# Patient Record
Sex: FEMALE | Race: BLACK OR AFRICAN AMERICAN | NOT HISPANIC OR LATINO | Employment: OTHER | URBAN - METROPOLITAN AREA
[De-identification: names, ages, dates, MRNs, and addresses within clinical notes are randomized per-mention and may not be internally consistent; named-entity substitution may affect disease eponyms.]

---

## 2017-02-02 ENCOUNTER — OFFICE VISIT (OUTPATIENT)
Dept: PSYCHIATRY | Facility: CLINIC | Age: 72
End: 2017-02-02
Payer: COMMERCIAL

## 2017-02-02 VITALS
WEIGHT: 234 LBS | SYSTOLIC BLOOD PRESSURE: 131 MMHG | BODY MASS INDEX: 37.61 KG/M2 | HEIGHT: 66 IN | HEART RATE: 45 BPM | DIASTOLIC BLOOD PRESSURE: 58 MMHG

## 2017-02-02 DIAGNOSIS — F31.9 BIPOLAR 1 DISORDER: Primary | ICD-10-CM

## 2017-02-02 PROCEDURE — 99214 OFFICE O/P EST MOD 30 MIN: CPT | Mod: S$GLB,,, | Performed by: PSYCHIATRY & NEUROLOGY

## 2017-02-02 PROCEDURE — 99999 PR PBB SHADOW E&M-EST. PATIENT-LVL II: CPT | Mod: PBBFAC,,, | Performed by: PSYCHIATRY & NEUROLOGY

## 2017-02-02 PROCEDURE — 1160F RVW MEDS BY RX/DR IN RCRD: CPT | Mod: S$GLB,,, | Performed by: PSYCHIATRY & NEUROLOGY

## 2017-02-02 PROCEDURE — 1159F MED LIST DOCD IN RCRD: CPT | Mod: S$GLB,,, | Performed by: PSYCHIATRY & NEUROLOGY

## 2017-02-02 PROCEDURE — 1157F ADVNC CARE PLAN IN RCRD: CPT | Mod: S$GLB,,, | Performed by: PSYCHIATRY & NEUROLOGY

## 2017-02-02 NOTE — PROGRESS NOTES
ESTABLISHED OUTPATIENT VISIT   E/M LEVEL 4: [id number]    ENCOUNTER DATE: 2/2/2017  SITE: [facility] [address]    HISTORY    CHIEF COMPLAINT   Ernestine Montejo is a 72 y.o. female who presents for follow up of mood d/o .    HPI   Reports, that Lithium remains helpful. Mood remains good.  Appears to be doing well psychiatrically.    Has been taking computer classes.    Psychiatric Review Of Systems - Is patient experiencing or having changes in:  sleep: good  appetite: no  Weight: has gained 3 lb's since previous visit  interest/pleasure/anhedonia: no  somatic symptoms: no  libido: no  anxiety/panic: no  guilty/hopelessness: no  concentration: no  S.I.B.s/risky behavior: no  Irritability: no  Racing thoughts: no  Impulsive behaviors: no  Paranoia:no  AVH:no    Recent alcohol: no  Recent drug: no    Medical ROS   Denies any current physical complaint     PAST MEDICAL, FAMILY AND SOCIAL HISTORY: The patient's past medical, family and social history have been reviewed and updated as appropriate within the electronic medical record - see encounter notes.    PSYCHOTROPIC MEDICATIONS   Lithium Carbonate 300 mg bid    EXAMINATION    Vitals:    02/02/17 1313   BP: (!) 131/58   Pulse: (!) 45     Weight: 234 lb's    CONSTITUTIONAL  General Appearance: well nourished    MUSCULOSKELETAL  Muscle Strength and Tone: normal strength and tone  Abnormal Involuntary Movements: no abnormal movement noted  Gait and Station: normal gait    PSYCHIATRIC   Level of Consciousness: alert  Orientation: oriented to person, place and time  Grooming: well groomed  Psychomotor Behavior: no restlessness/agitation  Speech: normal in rate, rhythm and volume  Language: normal vocabulary  Mood: euthymic  Affect: full range and appropriate  Thought Process: logical and goal directed  Associations: intact associations  Thought Content: no SI/HI  Memory: grossly intact  Attention: intact to content of interview  Fund of Knowledge: appears  adequate  Insight: good  Judgement: good    MEDICAL DECISION MAKING    DIAGNOSES  Mood d/o N.O.S.    PROBLEM LIST AND MANAGEMENT PLANS  - Mood d/o: continue Lithium 300 mg bid, monitor kidney function  - states, that she had blood work done recently via [facility]. Has requested that results be sent to me  - rtc 3 months    Time with patient: 25 min    LABORATORY DATA  No visits with results within 3 Month(s) from this visit.  Latest known visit with results is:    Admission on 05/30/2016, Discharged on 06/03/2016   Component Date Value Ref Range Status    Vitamin B-12 05/31/2016 526  210 - 950 pg/mL Final    Folate 05/31/2016 13.3  4.0 - 24.0 ng/mL Final    T3, Total 06/01/2016 81  60 - 180 ng/dL Final    RPR 06/01/2016 Non-reactive  Non-reactive Final    HIV 1/2 Ag/Ab 06/01/2016 Negative  Negative Final    TSH 06/01/2016 0.545  0.400 - 4.000 uIU/mL Final    Free T4 06/01/2016 1.03  0.71 - 1.51 ng/dL Final    WBC 06/03/2016 6.93  3.90 - 12.70 K/uL Final    RBC 06/03/2016 4.62  4.00 - 5.40 M/uL Final    Hemoglobin 06/03/2016 12.5  12.0 - 16.0 g/dL Final    Hematocrit 06/03/2016 38.1  37.0 - 48.5 % Final    MCV 06/03/2016 83  82 - 98 fL Final    MCH 06/03/2016 27.1  27.0 - 31.0 pg Final    MCHC 06/03/2016 32.8  32.0 - 36.0 % Final    RDW 06/03/2016 15.1* 11.5 - 14.5 % Final    Platelets 06/03/2016 231  150 - 350 K/uL Final    MPV 06/03/2016 10.9  9.2 - 12.9 fL Final    Gran # 06/03/2016 4.1  1.8 - 7.7 K/uL Final    Lymph # 06/03/2016 2.0  1.0 - 4.8 K/uL Final    Mono # 06/03/2016 0.6  0.3 - 1.0 K/uL Final    Eos # 06/03/2016 0.2  0.0 - 0.5 K/uL Final    Baso # 06/03/2016 0.01  0.00 - 0.20 K/uL Final    Gran% 06/03/2016 58.6  38.0 - 73.0 % Final    Lymph% 06/03/2016 29.0  18.0 - 48.0 % Final    Mono% 06/03/2016 9.2  4.0 - 15.0 % Final    Eosinophil% 06/03/2016 3.0  0.0 - 8.0 % Final    Basophil% 06/03/2016 0.1  0.0 - 1.9 % Final    Differential Method 06/03/2016 Automated   Final    Sodium  06/03/2016 142  136 - 145 mmol/L Final    Potassium 06/03/2016 3.8  3.5 - 5.1 mmol/L Final    Chloride 06/03/2016 111* 95 - 110 mmol/L Final    CO2 06/03/2016 21* 23 - 29 mmol/L Final    Glucose 06/03/2016 95  70 - 110 mg/dL Final    BUN, Bld 06/03/2016 14  8 - 23 mg/dL Final    Creatinine 06/03/2016 0.7  0.5 - 1.4 mg/dL Final    Calcium 06/03/2016 9.0  8.7 - 10.5 mg/dL Final    Total Protein 06/03/2016 6.3  6.0 - 8.4 g/dL Final    Albumin 06/03/2016 3.0* 3.5 - 5.2 g/dL Final    Total Bilirubin 06/03/2016 0.4  0.1 - 1.0 mg/dL Final    Comment: For infants and newborns, interpretation of results should be based  on gestational age, weight and in agreement with clinical  observations.  Premature Infant recommended reference ranges:  Up to 24 hours.............<8.0 mg/dL  Up to 48 hours............<12.0 mg/dL  3-5 days..................<15.0 mg/dL  6-29 days.................<15.0 mg/dL      Alkaline Phosphatase 06/03/2016 70  55 - 135 U/L Final    AST 06/03/2016 11  10 - 40 U/L Final    ALT 06/03/2016 8* 10 - 44 U/L Final    Anion Gap 06/03/2016 10  8 - 16 mmol/L Final    eGFR if African American 06/03/2016 >60.0  >60 mL/min/1.73 m^2 Final    eGFR if non African American 06/03/2016 >60.0  >60 mL/min/1.73 m^2 Final    Comment: Calculation used to obtain the estimated glomerular filtration  rate (eGFR) is the CKD-EPI equation. Since race is unknown   in our information system, the eGFR values for   -American and Non--American patients are given   for each creatinine result.             Christophe Iglesias

## 2017-02-02 NOTE — MR AVS SNAPSHOT
[facility] - Psychiatry  [address]  [facility] [address]  Phone: [phone]  Fax: [phone]                  Ernestine Montejo   2017 1:30 PM   Office Visit    Description:  Female : 1945   Provider:  Christophe Iglesias MD   Department:  [facility] - Psychiatry           Diagnoses this Visit        Comments    Bipolar 1 disorder    -  Primary            To Do List           Future Appointments        Provider Department Dept Phone    2017 1:30 PM Christophe Iglesias MD [facility] [phone]      Goals (5 Years of Data)     None      [facility]     [facility] Care Line -  Assistance  Registered nurses in the [facility] Center provide clinical advisement, health education, appointment booking, and other advisory services.  Call for this free service at [phone].             Medications           Message regarding Medications     Verify the changes and/or additions to your medication regime listed below are the same as discussed with your clinician today.  If any of these changes or additions are incorrect, please notify your healthcare provider.             Verify that the below list of medications is an accurate representation of the medications you are currently taking.  If none reported, the list may be blank. If incorrect, please contact your healthcare provider. Carry this list with you in case of emergency.           Current Medications     amlodipine (NORVASC) 5 MG tablet Take 1 tablet (5 mg total) by mouth every morning.    cloNIDine (CATAPRES) 0.1 MG tablet Take 1 tablet (0.1 mg total) by mouth 2 (two) times daily.    folic acid (FOLVITE) 1 MG tablet Take 1 tablet (1 mg total) by mouth once daily.    lithium (LITHOTAB) 300 mg tablet Take 1 tablet (300 mg total) by mouth 2 (two) times daily.    losartan (COZAAR) 100 MG tablet Take 1 tablet (100 mg total) by mouth once daily.    simvastatin (ZOCOR) 10 MG tablet Take 1 tablet (10 mg total)  "by mouth every evening.           Clinical Reference Information           Your Vitals Were     BP Pulse Height Weight BMI    131/58 45 5' 6" (1.676 m) 106.1 kg (234 lb) 37.77 kg/m2      Blood Pressure          Most Recent Value    BP  (!)  131/58      Allergies as of 2/2/2017     Codeine      Immunizations Administered on Date of Encounter - 2/2/2017     None      [facility] Sign-Up     Activating your [facility] account is as easy as 1-2-3!     1) Visit [email or web address], select Sign Up Now, enter this activation code and your date of birth, then select Next.  [email or web address]  Expires: 3/19/2017  3:17 PM      2) Create a username and password to use when you visit [facility] in the future and select a security question in case you lose your password and select Next.    3) Enter your e-mail address and click Sign Up!    Additional Information  If you have questions, please e-mail [email or web address] or call [phone] to talk to our [facility] staff. Remember, [facility] is NOT to be used for urgent needs. For medical emergencies, dial 911.         Language Assistance Services     ATTENTION: Language assistance services are available, free of charge. Please call [phone].      ATENCIÓN: Si habla español, tiene a rowell disposición servicios gratuitos de asistencia lingüística. Llame al [phone].     CHÚ Ý: N?u b?n nói Ti?ng Vi?t, có các d?ch v? h? tr? ngôn ng? mi?n phí dành cho b?n. G?i s? [phone].         [facility] complies with applicable Federal civil rights laws and does not discriminate on the basis of race, color, national origin, age, disability, or sex.        "

## 2017-08-21 ENCOUNTER — OFFICE VISIT (OUTPATIENT)
Dept: PSYCHIATRY | Facility: CLINIC | Age: 72
End: 2017-08-21
Payer: COMMERCIAL

## 2017-08-21 VITALS
HEIGHT: 66 IN | DIASTOLIC BLOOD PRESSURE: 66 MMHG | BODY MASS INDEX: 36.77 KG/M2 | SYSTOLIC BLOOD PRESSURE: 150 MMHG | WEIGHT: 228.81 LBS | HEART RATE: 47 BPM

## 2017-08-21 DIAGNOSIS — F31.9 BIPOLAR 1 DISORDER: Primary | ICD-10-CM

## 2017-08-21 PROCEDURE — 99999 PR PBB SHADOW E&M-EST. PATIENT-LVL II: CPT | Mod: PBBFAC,,, | Performed by: PSYCHIATRY & NEUROLOGY

## 2017-08-21 PROCEDURE — 1159F MED LIST DOCD IN RCRD: CPT | Mod: S$GLB,,, | Performed by: PSYCHIATRY & NEUROLOGY

## 2017-08-21 PROCEDURE — 99213 OFFICE O/P EST LOW 20 MIN: CPT | Mod: S$GLB,,, | Performed by: PSYCHIATRY & NEUROLOGY

## 2017-08-21 PROCEDURE — 3008F BODY MASS INDEX DOCD: CPT | Mod: S$GLB,,, | Performed by: PSYCHIATRY & NEUROLOGY

## 2017-08-21 RX ORDER — LITHIUM CARBONATE 300 MG
300 TABLET ORAL 2 TIMES DAILY
Qty: 60 TABLET | Refills: 3 | Status: ON HOLD | OUTPATIENT
Start: 2017-08-21 | End: 2022-10-19 | Stop reason: HOSPADM

## 2017-08-21 NOTE — PROGRESS NOTES
ESTABLISHED OUTPATIENT VISIT   E/M LEVEL 3: [id number]    ENCOUNTER DATE: 8/21/2017  SITE: [facility] [address]    HISTORY    CHIEF COMPLAINT   Ernestine Montejo is a 72 y.o. female who presents for follow up of mood d/o .    HPI     Reports doing well psychiatrically. Enjoys living in her residence. Satisfied with Lithium Carbonate 300 mg bid. Reports compliance.    Lab work from June of this year showed normal BUN/Creatinine]. Plans to see primary care MD tomorrow.    Psychiatric Review Of Systems - Is patient experiencing or having changes in:  sleep: good  appetite: no  Weight: has been able to lose some weight since previous visit.  interest/pleasure/anhedonia: no  somatic symptoms: no  libido: no  anxiety/panic: no  guilty/hopelessness: no  concentration: no  S.I.B.s/risky behavior: no  Irritability: no  Racing thoughts: no  Impulsive behaviors: no  Paranoia:no  AVH:no    Recent alcohol: no  Recent drug: no    Medical ROS   Denies any current physical complaint     PAST MEDICAL, FAMILY AND SOCIAL HISTORY: The patient's past medical, family and social history have been reviewed and updated as appropriate within the electronic medical record - see encounter notes.    PSYCHOTROPIC MEDICATIONS   Lithium Carbonate 300 mg bid    EXAMINATION    Vitals:    08/21/17 1414   BP: (!) 150/66   Pulse: (!) 47     Weight: 228 lb's    CONSTITUTIONAL  General Appearance: well nourished    MUSCULOSKELETAL  Muscle Strength and Tone: normal strength and tone  Abnormal Involuntary Movements: no abnormal movement noted  Gait and Station: normal gait    PSYCHIATRIC   Level of Consciousness: alert  Orientation: oriented to person, place and time  Grooming: well groomed  Psychomotor Behavior: no restlessness/agitation  Speech: normal in rate, rhythm and volume  Language: normal vocabulary  Mood: euthymic  Affect: full range and appropriate  Thought Process: logical and goal directed  Associations: intact  associations  Thought Content: no SI/HI  Memory: grossly intact  Attention: intact to content of interview  Fund of Knowledge: appears adequate  Insight: good  Judgement: good    MEDICAL DECISION MAKING    DIAGNOSES  Bipolar d/o    PROBLEM LIST AND MANAGEMENT PLANS  - Bipolar d/o: continue Lithium 300 mg bid, continue to monitor kidney function, obtain lithium level  - rtc 3 months    Time with patient: 20 min    LABORATORY DATA  No visits with results within 3 Month(s) from this visit.   Latest known visit with results is:   Admission on 05/30/2016, Discharged on 06/03/2016   Component Date Value Ref Range Status    Vitamin B-12 05/31/2016 526  210 - 950 pg/mL Final    Folate 05/31/2016 13.3  4.0 - 24.0 ng/mL Final    T3, Total 06/01/2016 81  60 - 180 ng/dL Final    RPR 06/01/2016 Non-reactive  Non-reactive Final    HIV 1/2 Ag/Ab 06/01/2016 Negative  Negative Final    TSH 06/01/2016 0.545  0.400 - 4.000 uIU/mL Final    Free T4 06/01/2016 1.03  0.71 - 1.51 ng/dL Final    WBC 06/03/2016 6.93  3.90 - 12.70 K/uL Final    RBC 06/03/2016 4.62  4.00 - 5.40 M/uL Final    Hemoglobin 06/03/2016 12.5  12.0 - 16.0 g/dL Final    Hematocrit 06/03/2016 38.1  37.0 - 48.5 % Final    MCV 06/03/2016 83  82 - 98 fL Final    MCH 06/03/2016 27.1  27.0 - 31.0 pg Final    MCHC 06/03/2016 32.8  32.0 - 36.0 % Final    RDW 06/03/2016 15.1* 11.5 - 14.5 % Final    Platelets 06/03/2016 231  150 - 350 K/uL Final    MPV 06/03/2016 10.9  9.2 - 12.9 fL Final    Gran # 06/03/2016 4.1  1.8 - 7.7 K/uL Final    Lymph # 06/03/2016 2.0  1.0 - 4.8 K/uL Final    Mono # 06/03/2016 0.6  0.3 - 1.0 K/uL Final    Eos # 06/03/2016 0.2  0.0 - 0.5 K/uL Final    Baso # 06/03/2016 0.01  0.00 - 0.20 K/uL Final    Gran% 06/03/2016 58.6  38.0 - 73.0 % Final    Lymph% 06/03/2016 29.0  18.0 - 48.0 % Final    Mono% 06/03/2016 9.2  4.0 - 15.0 % Final    Eosinophil% 06/03/2016 3.0  0.0 - 8.0 % Final    Basophil% 06/03/2016 0.1  0.0 - 1.9 % Final     Differential Method 06/03/2016 Automated   Final    Sodium 06/03/2016 142  136 - 145 mmol/L Final    Potassium 06/03/2016 3.8  3.5 - 5.1 mmol/L Final    Chloride 06/03/2016 111* 95 - 110 mmol/L Final    CO2 06/03/2016 21* 23 - 29 mmol/L Final    Glucose 06/03/2016 95  70 - 110 mg/dL Final    BUN, Bld 06/03/2016 14  8 - 23 mg/dL Final    Creatinine 06/03/2016 0.7  0.5 - 1.4 mg/dL Final    Calcium 06/03/2016 9.0  8.7 - 10.5 mg/dL Final    Total Protein 06/03/2016 6.3  6.0 - 8.4 g/dL Final    Albumin 06/03/2016 3.0* 3.5 - 5.2 g/dL Final    Total Bilirubin 06/03/2016 0.4  0.1 - 1.0 mg/dL Final    Comment: For infants and newborns, interpretation of results should be based  on gestational age, weight and in agreement with clinical  observations.  Premature Infant recommended reference ranges:  Up to 24 hours.............<8.0 mg/dL  Up to 48 hours............<12.0 mg/dL  3-5 days..................<15.0 mg/dL  6-29 days.................<15.0 mg/dL      Alkaline Phosphatase 06/03/2016 70  55 - 135 U/L Final    AST 06/03/2016 11  10 - 40 U/L Final    ALT 06/03/2016 8* 10 - 44 U/L Final    Anion Gap 06/03/2016 10  8 - 16 mmol/L Final    eGFR if African American 06/03/2016 >60.0  >60 mL/min/1.73 m^2 Final    eGFR if non African American 06/03/2016 >60.0  >60 mL/min/1.73 m^2 Final    Comment: Calculation used to obtain the estimated glomerular filtration  rate (eGFR) is the CKD-EPI equation. Since race is unknown   in our information system, the eGFR values for   -American and Non--American patients are given   for each creatinine result.             Christophe Iglesias

## 2020-02-09 ENCOUNTER — HOSPITAL ENCOUNTER (EMERGENCY)
Facility: HOSPITAL | Age: 75
Discharge: HOME | End: 2020-02-09
Attending: EMERGENCY MEDICINE
Payer: MEDICARE

## 2020-02-09 VITALS
BODY MASS INDEX: 37.77 KG/M2 | HEIGHT: 66 IN | DIASTOLIC BLOOD PRESSURE: 76 MMHG | TEMPERATURE: 98 F | OXYGEN SATURATION: 100 % | WEIGHT: 235 LBS | RESPIRATION RATE: 17 BRPM | HEART RATE: 72 BPM | SYSTOLIC BLOOD PRESSURE: 168 MMHG

## 2020-02-09 DIAGNOSIS — R45.851 SUICIDAL IDEATION: ICD-10-CM

## 2020-02-09 DIAGNOSIS — F41.9 ANXIETY: ICD-10-CM

## 2020-02-09 DIAGNOSIS — F33.1 MDD (MAJOR DEPRESSIVE DISORDER), RECURRENT EPISODE, MODERATE: Primary | ICD-10-CM

## 2020-02-09 DIAGNOSIS — F31.32 BIPOLAR 1 DISORDER, DEPRESSED, MODERATE: ICD-10-CM

## 2020-02-09 LAB
ALBUMIN SERPL BCP-MCNC: 3.7 G/DL (ref 3.5–5.2)
ALBUMIN SERPL BCP-MCNC: 3.7 G/DL (ref 3.5–5.2)
ALP SERPL-CCNC: 94 U/L (ref 55–135)
ALP SERPL-CCNC: 94 U/L (ref 55–135)
ALT SERPL W/O P-5'-P-CCNC: 8 U/L (ref 10–44)
ALT SERPL W/O P-5'-P-CCNC: 8 U/L (ref 10–44)
ANION GAP SERPL CALC-SCNC: 13 MMOL/L (ref 8–16)
ANION GAP SERPL CALC-SCNC: 13 MMOL/L (ref 8–16)
APAP SERPL-MCNC: <3 UG/ML (ref 10–20)
AST SERPL-CCNC: 12 U/L (ref 10–40)
AST SERPL-CCNC: 12 U/L (ref 10–40)
BASOPHILS # BLD AUTO: 0.04 K/UL (ref 0–0.2)
BASOPHILS NFR BLD: 0.6 % (ref 0–1.9)
BILIRUB SERPL-MCNC: 0.6 MG/DL (ref 0.1–1)
BILIRUB SERPL-MCNC: 0.6 MG/DL (ref 0.1–1)
BUN SERPL-MCNC: 9 MG/DL (ref 8–23)
BUN SERPL-MCNC: 9 MG/DL (ref 8–23)
CALCIUM SERPL-MCNC: 9.9 MG/DL (ref 8.7–10.5)
CALCIUM SERPL-MCNC: 9.9 MG/DL (ref 8.7–10.5)
CHLORIDE SERPL-SCNC: 106 MMOL/L (ref 95–110)
CHLORIDE SERPL-SCNC: 106 MMOL/L (ref 95–110)
CO2 SERPL-SCNC: 22 MMOL/L (ref 23–29)
CO2 SERPL-SCNC: 22 MMOL/L (ref 23–29)
CREAT SERPL-MCNC: 0.8 MG/DL (ref 0.5–1.4)
CREAT SERPL-MCNC: 0.8 MG/DL (ref 0.5–1.4)
DIFFERENTIAL METHOD: ABNORMAL
EOSINOPHIL # BLD AUTO: 0.1 K/UL (ref 0–0.5)
EOSINOPHIL NFR BLD: 1.6 % (ref 0–8)
ERYTHROCYTE [DISTWIDTH] IN BLOOD BY AUTOMATED COUNT: 15.1 % (ref 11.5–14.5)
EST. GFR  (AFRICAN AMERICAN): >60 ML/MIN/1.73 M^2
EST. GFR  (AFRICAN AMERICAN): >60 ML/MIN/1.73 M^2
EST. GFR  (NON AFRICAN AMERICAN): >60 ML/MIN/1.73 M^2
EST. GFR  (NON AFRICAN AMERICAN): >60 ML/MIN/1.73 M^2
ETHANOL SERPL-MCNC: <10 MG/DL
GLUCOSE SERPL-MCNC: 106 MG/DL (ref 70–110)
GLUCOSE SERPL-MCNC: 106 MG/DL (ref 70–110)
HCT VFR BLD AUTO: 46.2 % (ref 37–48.5)
HGB BLD-MCNC: 14.2 G/DL (ref 12–16)
IMM GRANULOCYTES # BLD AUTO: 0.02 K/UL (ref 0–0.04)
IMM GRANULOCYTES NFR BLD AUTO: 0.3 % (ref 0–0.5)
LYMPHOCYTES # BLD AUTO: 1.7 K/UL (ref 1–4.8)
LYMPHOCYTES NFR BLD: 26.4 % (ref 18–48)
MCH RBC QN AUTO: 26.2 PG (ref 27–31)
MCHC RBC AUTO-ENTMCNC: 30.7 G/DL (ref 32–36)
MCV RBC AUTO: 85 FL (ref 82–98)
MONOCYTES # BLD AUTO: 0.7 K/UL (ref 0.3–1)
MONOCYTES NFR BLD: 10.6 % (ref 4–15)
NEUTROPHILS # BLD AUTO: 3.9 K/UL (ref 1.8–7.7)
NEUTROPHILS NFR BLD: 60.5 % (ref 38–73)
NRBC BLD-RTO: 0 /100 WBC
PLATELET # BLD AUTO: 334 K/UL (ref 150–350)
PMV BLD AUTO: 11 FL (ref 9.2–12.9)
POTASSIUM SERPL-SCNC: 3.6 MMOL/L (ref 3.5–5.1)
POTASSIUM SERPL-SCNC: 3.6 MMOL/L (ref 3.5–5.1)
PROT SERPL-MCNC: 8.2 G/DL (ref 6–8.4)
PROT SERPL-MCNC: 8.2 G/DL (ref 6–8.4)
RBC # BLD AUTO: 5.42 M/UL (ref 4–5.4)
SODIUM SERPL-SCNC: 141 MMOL/L (ref 136–145)
SODIUM SERPL-SCNC: 141 MMOL/L (ref 136–145)
TSH SERPL DL<=0.005 MIU/L-ACNC: 0.49 UIU/ML (ref 0.4–4)
WBC # BLD AUTO: 6.4 K/UL (ref 3.9–12.7)

## 2020-02-09 PROCEDURE — 99283 EMERGENCY DEPT VISIT LOW MDM: CPT

## 2020-02-09 PROCEDURE — 99284 PR EMERGENCY DEPT VISIT,LEVEL IV: ICD-10-PCS | Mod: ,,, | Performed by: EMERGENCY MEDICINE

## 2020-02-09 PROCEDURE — 80320 DRUG SCREEN QUANTALCOHOLS: CPT

## 2020-02-09 PROCEDURE — 85025 COMPLETE CBC W/AUTO DIFF WBC: CPT

## 2020-02-09 PROCEDURE — 80053 COMPREHEN METABOLIC PANEL: CPT

## 2020-02-09 PROCEDURE — 84443 ASSAY THYROID STIM HORMONE: CPT

## 2020-02-09 PROCEDURE — 80329 ANALGESICS NON-OPIOID 1 OR 2: CPT

## 2020-02-09 PROCEDURE — 99284 EMERGENCY DEPT VISIT MOD MDM: CPT | Mod: ,,, | Performed by: EMERGENCY MEDICINE

## 2020-02-09 RX ORDER — MIRTAZAPINE 15 MG/1
15 TABLET, ORALLY DISINTEGRATING ORAL NIGHTLY
Qty: 30 TABLET | Refills: 0 | Status: SHIPPED | OUTPATIENT
Start: 2020-02-09 | End: 2020-03-18 | Stop reason: SDUPTHER

## 2020-02-09 NOTE — ED NOTES
Patient resting in stretcher, NAD noted, 1:1 observation maintained. All safety precautions in place. Patient declines any needs at this time. Patient denies pain. Will continue to monitor.

## 2020-02-09 NOTE — ED PROVIDER NOTES
Encounter Date: 2/9/2020       History     Chief Complaint   Patient presents with    Psychiatric Evaluation     bipolar depression, denies  suicidal homicical ideation, off meds     75F with Bipolar 1, HTN, and HL presents to ED with Suicidal Ideations and depressive symptoms. Patient self stopped Lithium 2 years ago or so. She does not feel like she wants to hurt herself. But she has lost motivation to clean and cook, and even self hygiene. Has not been going to [employer], has not been playing bingo. Isolating herself from other residents in senior home. Patient is aware of this, expressing it. Sleeping less, maybe only 3 hours a night. Eating less than normal. Increased anxiety. These symptoms have been going on for over a month now. She has been feeling 'down' overall, and calls it depressed mood. She denies alcohol or drug use. She feels safe in senior citizen home. Her psychiatrist is Dr. Christophe Iglesias. She has not seen him in almost 2 years, she was meant to reschedule but never did. Her psychiatrist is unaware that she stopped taking lithium, per patient. She has tried multiple psych meds in past, however, was on lithium alone for a couple years prior to self-stopping. Denies wanting to hurt herself or having a plan. Sister-in-law past away about 6 weeks ago, which may have stimulated depression.     Last huang episode of no sleep for several days was ~20 years ago, last depressive state was ~3.5 years ago when she was hospitalized here.        Review of patient's allergies indicates:   Allergen Reactions    Codeine Edema     Past Medical History:   Diagnosis Date    Depression     High blood cholesterol     Hypertension     Mood disorder      Past Surgical History:   Procedure Laterality Date    HYSTERECTOMY       No family history on file.  Social History     Tobacco Use    Smoking status: Never Smoker   Substance Use Topics    Alcohol use: No    Drug use: No     Review of Systems   Constitutional:  Negative for chills, fatigue and fever.   HENT: Negative for sore throat.    Eyes: Negative for photophobia and visual disturbance.   Respiratory: Negative for shortness of breath.    Cardiovascular: Negative for chest pain.   Gastrointestinal: Negative for nausea.   Endocrine: Negative for polyphagia and polyuria.   Genitourinary: Negative for dysuria.   Musculoskeletal: Negative for back pain, neck pain and neck stiffness.   Skin: Negative for pallor and rash.   Allergic/Immunologic: Negative for immunocompromised state.   Neurological: Negative for facial asymmetry, weakness and headaches.   Hematological: Does not bruise/bleed easily.   Psychiatric/Behavioral: The patient is nervous/anxious.         Symptoms of Depression:   +diminished mood or loss of interest/anhedonia; +irritability, +diminished energy, +change in sleep, +change in appetite, +diminished concentration or cognition or indecisiveness, +suicidal ideations,     Denies excessive guilt or hopelessness or worthlessness         Physical Exam     Initial Vitals [02/09/20 1021]   BP Pulse Resp Temp SpO2   (!) 183/77 63 16 97.9 °F (36.6 °C) 97 %      MAP       --         Physical Exam    Nursing note and vitals reviewed.  Constitutional: She appears well-developed and well-nourished. She is not diaphoretic. No distress.   HENT:   Head: Normocephalic and atraumatic.   Eyes: Pupils are equal, round, and reactive to light. No scleral icterus.   Neck: Normal range of motion. No tracheal deviation present. No JVD present.   Cardiovascular: Normal rate and regular rhythm. Exam reveals no friction rub.    No murmur heard.  Pulmonary/Chest: Breath sounds normal. No stridor. No respiratory distress. She has no wheezes.   Abdominal: Soft. She exhibits no distension. There is no tenderness.   Musculoskeletal: Normal range of motion. She exhibits no edema or tenderness.   Neurological: She is alert and oriented to person, place, and time. No cranial nerve deficit.    Skin: Skin is warm and dry. Capillary refill takes less than 2 seconds.   Psychiatric: She has a normal mood and affect. Thought content normal.         ED Course   Procedures  Labs Reviewed - No data to display       Imaging Results    None                                          Clinical Impression:       ICD-10-CM ICD-9-CM   1. MDD (major depressive disorder), recurrent episode, moderate F33.1 296.32   2. Suicidal ideation R45.851 V62.84   3. Anxiety F41.9 300.00   4. Bipolar 1 disorder, depressed, moderate F31.32 296.52              Patient with criteria for MDD:  +diminished mood or loss of interest/anhedonia; +irritability, +diminished energy, +change in sleep, +change in appetite, +diminished concentration or cognition or indecisiveness, +suicidal ideations,    Will consult Psych to evaluate for inpt vs outpt management and for potential medication initiation.     According to psych recommendations:  Patient is no immediate threat to herself  -Will d/c patient to live with her daughter for next month and give 1 month prescription for remeron 15 mg nightly  -outpt psych apt asap        Tad Quispe MD  Resident  02/09/20 3887

## 2020-02-09 NOTE — ASSESSMENT & PLAN NOTE
Assessment:   Ernestine Montejo is a 75 y.o. female with a past psychiatric history of bipolar depression who [place] [facility] [place] voluntarily for worsening depressive symptoms.     Although patient currently endorses several depression symptoms for the past month concerning for major depressive episode, pt currently denies SI/HI/AVH. Patient denies recent SI; denies ever having a plan to harm herself. Patient has good insight into her illness and desires re-initiation of medications. Pt exhibits strong motivation for treatment and change. Discussed at length with patient and family regarding outpt vs inpt psych hospitalization options. Both family and patient would prefer to go home and make appointment with outpt psychiatrist at [facility] first thing tomorrow. Patient will live with daughter and her family until this outpt appointment, where she will be supervised and monitored by family constantly. Patient's family will also be able to assist patient with her ADLs.    Patient amenable to starting Remeron 15mg qHS while awaiting outpt psych appointment. Per chart review, pt had been on it before and seemed to have discontinued by herself. No documentation of previous negative side effects from Remeron. Pt also unable to recall negative side effects from this medication and amenable to trial.     Impression:  Major Depressive Episode  R/o Adjustment Disorder  Hx of bipolar depression    Recommendations:     - Start Remeron 15mg qHS. Patient may initiate this medication while awaiting outpt psych appointment.   - Instruct family and patient to call [facility] @ [phone] to make appointment. Discussed inquiring about possible availabilities in the [facility] with Dr Mattson.  - Discharge home with family. Patient has appropriate safety plan with good family support at this time while awaiting outpt psych appointment.  - Return to ED for continuation or worsening of  symptoms.      Case discussed with Dr. Akila Wan MD    [facility]  PGY-2
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
## 2020-02-09 NOTE — ED TRIAGE NOTES
"Patient reports being "depressed and without motivation" for > one month. Patient denies SI/HI. Patient states, " I just don't want to clean or cook or even complete my personal hygiene". Patient is calm and cooperative and behavior is appropriate to situation. NAD noted. Patient is A&Ox4 and following commands.   "
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
## 2020-03-18 ENCOUNTER — OFFICE VISIT (OUTPATIENT)
Dept: PSYCHIATRY | Facility: CLINIC | Age: 75
End: 2020-03-18
Payer: MEDICARE

## 2020-03-18 VITALS
SYSTOLIC BLOOD PRESSURE: 135 MMHG | HEART RATE: 50 BPM | DIASTOLIC BLOOD PRESSURE: 60 MMHG | BODY MASS INDEX: 37.74 KG/M2 | HEIGHT: 66 IN | WEIGHT: 234.81 LBS

## 2020-03-18 DIAGNOSIS — F31.9 BIPOLAR 1 DISORDER: Primary | ICD-10-CM

## 2020-03-18 PROCEDURE — 99999 PR PBB SHADOW E&M-EST. PATIENT-LVL II: ICD-10-PCS | Mod: PBBFAC,,, | Performed by: PSYCHIATRY & NEUROLOGY

## 2020-03-18 PROCEDURE — 1159F MED LIST DOCD IN RCRD: CPT | Mod: S$GLB,,, | Performed by: PSYCHIATRY & NEUROLOGY

## 2020-03-18 PROCEDURE — 1159F PR MEDICATION LIST DOCUMENTED IN MEDICAL RECORD: ICD-10-PCS | Mod: S$GLB,,, | Performed by: PSYCHIATRY & NEUROLOGY

## 2020-03-18 PROCEDURE — 99999 PR PBB SHADOW E&M-EST. PATIENT-LVL II: CPT | Mod: PBBFAC,,, | Performed by: PSYCHIATRY & NEUROLOGY

## 2020-03-18 PROCEDURE — 99213 OFFICE O/P EST LOW 20 MIN: CPT | Mod: S$GLB,,, | Performed by: PSYCHIATRY & NEUROLOGY

## 2020-03-18 PROCEDURE — 99213 PR OFFICE/OUTPT VISIT, EST, LEVL III, 20-29 MIN: ICD-10-PCS | Mod: S$GLB,,, | Performed by: PSYCHIATRY & NEUROLOGY

## 2020-03-18 RX ORDER — MIRTAZAPINE 15 MG/1
15 TABLET, ORALLY DISINTEGRATING ORAL NIGHTLY
Qty: 30 TABLET | Refills: 3 | Status: SHIPPED | OUTPATIENT
Start: 2020-03-18 | End: 2020-07-27

## 2020-03-18 NOTE — PROGRESS NOTES
"ESTABLISHED OUTPATIENT VISIT   E/M LEVEL 3: [id number]    ENCOUNTER DATE: 3/18/2020  SITE: [facility], [facility]    HISTORY    CHIEF COMPLAINT   Ernestine Montejo is a 75 y.o. female who presents for follow up of mood d/o .    HPI     From ER visit from 02/09/20:  "75F with Bipolar 1, HTN, and HL presents to ED with Suicidal Ideations and depressive symptoms. Patient self stopped Lithium 2 years ago or so. She does not feel like she wants to hurt herself. But she has lost motivation to clean and cook, and even self hygiene. Has not been going to Baptism, has not been playing bingo. Isolating herself from other residents in senior home. Patient is aware of this, expressing it. Sleeping less, maybe only 3 hours a night. Eating less than normal. Increased anxiety. These symptoms have been going on for over a month now. She has been feeling 'down' overall, and calls it depressed mood. She denies alcohol or drug use. She feels safe in senior citizen home. Her psychiatrist is Dr. Christophe Iglesias. She has not seen him in almost 2 years, she was meant to reschedule but never did. Her psychiatrist is unaware that she stopped taking lithium, per patient. She has tried multiple psych meds in past, however, was on lithium alone for a couple years prior to self-stopping. Denies wanting to hurt herself or having a plan. Sister-in-law past away about 6 weeks ago, which may have stimulated depression."    After ER visit from 02/09/20 pt. Stayed with daughter for about a month, now living in residence again[independent living]. Took Remeron 15 mg at bedtime for a month, ran out about a week ago.    Today reports doing well psychiatrically. Mood is stable, sleeping well.    Psychiatric Review Of Systems - Is patient experiencing or having changes in:  sleep: good  appetite: no  Weight: no  interest/pleasure/anhedonia: no  somatic symptoms: no  libido: no  anxiety/panic: no  guilty/hopelessness: no  concentration: " no  S.I.B.s/risky behavior: no  Irritability: no  Racing thoughts: no  Impulsive behaviors: no  Paranoia:no  AVH:no    Recent alcohol: no  Recent drug: no    Medical ROS   Denies any current physical complaint     PAST MEDICAL, FAMILY AND SOCIAL HISTORY: The patient's past medical, family and social history have been reviewed and updated as appropriate within the electronic medical record - see encounter notes.    PSYCHOTROPIC MEDICATIONS   none    EXAMINATION    Vitals:    03/18/20 1116   BP: 135/60   Pulse: (!) 50     Weight: 234 lb's    CONSTITUTIONAL  General Appearance: well nourished    MUSCULOSKELETAL  Muscle Strength and Tone: normal strength and tone  Abnormal Involuntary Movements: no abnormal movement noted  Gait and Station: normal gait    PSYCHIATRIC   Level of Consciousness: alert  Orientation: oriented to person, place and time  Grooming: well groomed  Psychomotor Behavior: no restlessness/agitation  Speech: normal in rate, rhythm and volume  Language: normal vocabulary  Mood: euthymic  Affect: full range and appropriate  Thought Process: logical and goal directed  Associations: intact associations  Thought Content: no SI/HI  Memory: grossly intact  Attention: intact to content of interview  Fund of Knowledge: appears adequate  Insight: good  Judgement: good    MEDICAL DECISION MAKING    DIAGNOSES  Bipolar d/o    PROBLEM LIST AND MANAGEMENT PLANS  - Bipolar d/o: resume Remeron soluble tablets 15 mg at bedtime, monitor mood and weight  - rtc 3 months    Time with patient: 20 min    LABORATORY DATA  Admission on 02/09/2020, Discharged on 02/09/2020   Component Date Value Ref Range Status    Sodium 02/09/2020 141  136 - 145 mmol/L Final    Potassium 02/09/2020 3.6  3.5 - 5.1 mmol/L Final    Chloride 02/09/2020 106  95 - 110 mmol/L Final    CO2 02/09/2020 22* 23 - 29 mmol/L Final    Glucose 02/09/2020 106  70 - 110 mg/dL Final    BUN, Bld 02/09/2020 9  8 - 23 mg/dL Final    Creatinine 02/09/2020  0.8  0.5 - 1.4 mg/dL Final    Calcium 02/09/2020 9.9  8.7 - 10.5 mg/dL Final    Total Protein 02/09/2020 8.2  6.0 - 8.4 g/dL Final    Albumin 02/09/2020 3.7  3.5 - 5.2 g/dL Final    Total Bilirubin 02/09/2020 0.6  0.1 - 1.0 mg/dL Final    Comment: For infants and newborns, interpretation of results should be based  on gestational age, weight and in agreement with clinical  observations.  Premature Infant recommended reference ranges:  Up to 24 hours.............<8.0 mg/dL  Up to 48 hours............<12.0 mg/dL  3-5 days..................<15.0 mg/dL  6-29 days.................<15.0 mg/dL      Alkaline Phosphatase 02/09/2020 94  55 - 135 U/L Final    AST 02/09/2020 12  10 - 40 U/L Final    ALT 02/09/2020 8* 10 - 44 U/L Final    Anion Gap 02/09/2020 13  8 - 16 mmol/L Final    eGFR if African American 02/09/2020 >60.0  >60 mL/min/1.73 m^2 Final    eGFR if non African American 02/09/2020 >60.0  >60 mL/min/1.73 m^2 Final    Comment: Calculation used to obtain the estimated glomerular filtration  rate (eGFR) is the CKD-EPI equation.       WBC 02/09/2020 6.40  3.90 - 12.70 K/uL Final    RBC 02/09/2020 5.42* 4.00 - 5.40 M/uL Final    Hemoglobin 02/09/2020 14.2  12.0 - 16.0 g/dL Final    Hematocrit 02/09/2020 46.2  37.0 - 48.5 % Final    Mean Corpuscular Volume 02/09/2020 85  82 - 98 fL Final    Mean Corpuscular Hemoglobin 02/09/2020 26.2* 27.0 - 31.0 pg Final    Mean Corpuscular Hemoglobin Conc 02/09/2020 30.7* 32.0 - 36.0 g/dL Final    RDW 02/09/2020 15.1* 11.5 - 14.5 % Final    Platelets 02/09/2020 334  150 - 350 K/uL Final    MPV 02/09/2020 11.0  9.2 - 12.9 fL Final    Immature Granulocytes 02/09/2020 0.3  0.0 - 0.5 % Final    Gran # (ANC) 02/09/2020 3.9  1.8 - 7.7 K/uL Final    Immature Grans (Abs) 02/09/2020 0.02  0.00 - 0.04 K/uL Final    Comment: Mild elevation in immature granulocytes is non specific and   can be seen in a variety of conditions including stress response,   acute inflammation,  trauma and pregnancy. Correlation with other   laboratory and clinical findings is essential.      Lymph # 02/09/2020 1.7  1.0 - 4.8 K/uL Final    Mono # 02/09/2020 0.7  0.3 - 1.0 K/uL Final    Eos # 02/09/2020 0.1  0.0 - 0.5 K/uL Final    Baso # 02/09/2020 0.04  0.00 - 0.20 K/uL Final    nRBC 02/09/2020 0  0 /100 WBC Final    Gran% 02/09/2020 60.5  38.0 - 73.0 % Final    Lymph% 02/09/2020 26.4  18.0 - 48.0 % Final    Mono% 02/09/2020 10.6  4.0 - 15.0 % Final    Eosinophil% 02/09/2020 1.6  0.0 - 8.0 % Final    Basophil% 02/09/2020 0.6  0.0 - 1.9 % Final    Differential Method 02/09/2020 Automated   Final    Sodium 02/09/2020 141  136 - 145 mmol/L Final    Potassium 02/09/2020 3.6  3.5 - 5.1 mmol/L Final    Chloride 02/09/2020 106  95 - 110 mmol/L Final    CO2 02/09/2020 22* 23 - 29 mmol/L Final    Glucose 02/09/2020 106  70 - 110 mg/dL Final    BUN, Bld 02/09/2020 9  8 - 23 mg/dL Final    Creatinine 02/09/2020 0.8  0.5 - 1.4 mg/dL Final    Calcium 02/09/2020 9.9  8.7 - 10.5 mg/dL Final    Total Protein 02/09/2020 8.2  6.0 - 8.4 g/dL Final    Albumin 02/09/2020 3.7  3.5 - 5.2 g/dL Final    Total Bilirubin 02/09/2020 0.6  0.1 - 1.0 mg/dL Final    Comment: For infants and newborns, interpretation of results should be based  on gestational age, weight and in agreement with clinical  observations.  Premature Infant recommended reference ranges:  Up to 24 hours.............<8.0 mg/dL  Up to 48 hours............<12.0 mg/dL  3-5 days..................<15.0 mg/dL  6-29 days.................<15.0 mg/dL      Alkaline Phosphatase 02/09/2020 94  55 - 135 U/L Final    AST 02/09/2020 12  10 - 40 U/L Final    ALT 02/09/2020 8* 10 - 44 U/L Final    Anion Gap 02/09/2020 13  8 - 16 mmol/L Final    eGFR if African American 02/09/2020 >60.0  >60 mL/min/1.73 m^2 Final    eGFR if non African American 02/09/2020 >60.0  >60 mL/min/1.73 m^2 Final    Comment: Calculation used to obtain the estimated glomerular  filtration  rate (eGFR) is the CKD-EPI equation.       TSH 02/09/2020 0.491  0.400 - 4.000 uIU/mL Final    Alcohol, Medical, Serum 02/09/2020 <10  <10 mg/dL Final    Acetaminophen (Tylenol), Serum 02/09/2020 <3.0* 10.0 - 20.0 ug/mL Final    Comment: Toxic Levels:  Adults (4 hr post-ingestion).........>150 ug/mL  Adults (12 hr post-ingestion)........>40 ug/mL  Peds (2 hr post-ingestion, liquid)...>225 ug/mL             Christophe Iglesias

## 2020-07-31 ENCOUNTER — OFFICE VISIT (OUTPATIENT)
Dept: PSYCHIATRY | Facility: CLINIC | Age: 75
End: 2020-07-31
Payer: COMMERCIAL

## 2020-07-31 DIAGNOSIS — F31.9 BIPOLAR 1 DISORDER: Primary | ICD-10-CM

## 2020-07-31 PROCEDURE — 1159F MED LIST DOCD IN RCRD: CPT | Mod: ,,, | Performed by: PSYCHIATRY & NEUROLOGY

## 2020-07-31 PROCEDURE — 1159F PR MEDICATION LIST DOCUMENTED IN MEDICAL RECORD: ICD-10-PCS | Mod: ,,, | Performed by: PSYCHIATRY & NEUROLOGY

## 2020-07-31 PROCEDURE — 99214 OFFICE O/P EST MOD 30 MIN: CPT | Mod: 95,,, | Performed by: PSYCHIATRY & NEUROLOGY

## 2020-07-31 PROCEDURE — 99214 PR OFFICE/OUTPT VISIT, EST, LEVL IV, 30-39 MIN: ICD-10-PCS | Mod: 95,,, | Performed by: PSYCHIATRY & NEUROLOGY

## 2020-07-31 NOTE — PROGRESS NOTES
The patient location is: [facility]  The chief complaint leading to consultation is: mood d/o    Visit type: audio    Face to Face time with patient: 15 min  15 minutes of total time spent on the encounter, which includes face to face time and non-face to face time preparing to see the patient (eg, review of tests), Obtaining and/or reviewing separately obtained history, Documenting clinical information in the electronic or other health record, Independently interpreting results (not separately reported) and communicating results to the patient/family/caregiver, or Care coordination (not separately reported).     Each patient to whom he or she provides medical services by telemedicine is:  (1) informed of the relationship between the physician and patient and the respective role of any other health care provider with respect to management of the patient; and (2) notified that he or she may decline to receive medical services by telemedicine and may withdraw from such care at any time.    Notes:     ESTABLISHED OUTPATIENT VISIT   E/M LEVEL 4: [id number]    ENCOUNTER DATE: 7/31/2020  SITE: [facility] [address]    HISTORY    CHIEF COMPLAINT   Ernestine Montejo is a 75 y.o. female who presents for follow up of mood d/o .    HPI     Today reports doing well psychiatrically. Mood is stable, sleeping well, about 8 hours. Not gaining weight.    No recent significant stress.    Psychiatric Review Of Systems - Is patient experiencing or having changes in:  sleep: good  appetite: no  Weight: no  interest/pleasure/anhedonia: no  somatic symptoms: no  libido: no  anxiety/panic: no  guilty/hopelessness: no  concentration: no  S.I.B.s/risky behavior: no  Irritability: no  Racing thoughts: no  Impulsive behaviors: no  Paranoia:no  AVH:no    Recent alcohol: no  Recent drug: no    Medical ROS   Denies any current physical complaint     PAST MEDICAL, FAMILY AND SOCIAL HISTORY: The patient's past medical, family and social  history have been reviewed and updated as appropriate within the electronic medical record - see encounter notes.    PSYCHOTROPIC MEDICATIONS   Remeron soluble tablets 15 mg at bedtime    EXAMINATION    There were no vitals filed for this visit.    CONSTITUTIONAL  General Appearance: well nourished    MUSCULOSKELETAL  Muscle Strength and Tone: normal strength and tone  Abnormal Involuntary Movements: no abnormal movement noted  Gait and Station: normal gait    PSYCHIATRIC   Level of Consciousness: alert  Orientation: oriented to person, place and time  Grooming: well groomed  Psychomotor Behavior: no restlessness/agitation  Speech: normal in rate, rhythm and volume  Language: normal vocabulary  Mood: euthymic  Affect: full range and appropriate  Thought Process: logical and goal directed  Associations: intact associations  Thought Content: no SI/HI  Memory: grossly intact  Attention: intact to content of interview  Fund of Knowledge: appears adequate  Insight: good  Judgement: good    MEDICAL DECISION MAKING    DIAGNOSES  Bipolar d/o    PROBLEM LIST AND MANAGEMENT PLANS  - Bipolar d/o: continue Remeron soluble tablets 15 mg at bedtime, monitor mood and weight  - rtc 3 months    Time with patient: 15 min    LABORATORY DATA  No visits with results within 3 Month(s) from this visit.   Latest known visit with results is:   Admission on 02/09/2020, Discharged on 02/09/2020   Component Date Value Ref Range Status    Sodium 02/09/2020 141  136 - 145 mmol/L Final    Potassium 02/09/2020 3.6  3.5 - 5.1 mmol/L Final    Chloride 02/09/2020 106  95 - 110 mmol/L Final    CO2 02/09/2020 22* 23 - 29 mmol/L Final    Glucose 02/09/2020 106  70 - 110 mg/dL Final    BUN, Bld 02/09/2020 9  8 - 23 mg/dL Final    Creatinine 02/09/2020 0.8  0.5 - 1.4 mg/dL Final    Calcium 02/09/2020 9.9  8.7 - 10.5 mg/dL Final    Total Protein 02/09/2020 8.2  6.0 - 8.4 g/dL Final    Albumin 02/09/2020 3.7  3.5 - 5.2 g/dL Final    Total  Bilirubin 02/09/2020 0.6  0.1 - 1.0 mg/dL Final    Comment: For infants and newborns, interpretation of results should be based  on gestational age, weight and in agreement with clinical  observations.  Premature Infant recommended reference ranges:  Up to 24 hours.............<8.0 mg/dL  Up to 48 hours............<12.0 mg/dL  3-5 days..................<15.0 mg/dL  6-29 days.................<15.0 mg/dL      Alkaline Phosphatase 02/09/2020 94  55 - 135 U/L Final    AST 02/09/2020 12  10 - 40 U/L Final    ALT 02/09/2020 8* 10 - 44 U/L Final    Anion Gap 02/09/2020 13  8 - 16 mmol/L Final    eGFR if African American 02/09/2020 >60.0  >60 mL/min/1.73 m^2 Final    eGFR if non African American 02/09/2020 >60.0  >60 mL/min/1.73 m^2 Final    Comment: Calculation used to obtain the estimated glomerular filtration  rate (eGFR) is the CKD-EPI equation.       WBC 02/09/2020 6.40  3.90 - 12.70 K/uL Final    RBC 02/09/2020 5.42* 4.00 - 5.40 M/uL Final    Hemoglobin 02/09/2020 14.2  12.0 - 16.0 g/dL Final    Hematocrit 02/09/2020 46.2  37.0 - 48.5 % Final    Mean Corpuscular Volume 02/09/2020 85  82 - 98 fL Final    Mean Corpuscular Hemoglobin 02/09/2020 26.2* 27.0 - 31.0 pg Final    Mean Corpuscular Hemoglobin Conc 02/09/2020 30.7* 32.0 - 36.0 g/dL Final    RDW 02/09/2020 15.1* 11.5 - 14.5 % Final    Platelets 02/09/2020 334  150 - 350 K/uL Final    MPV 02/09/2020 11.0  9.2 - 12.9 fL Final    Immature Granulocytes 02/09/2020 0.3  0.0 - 0.5 % Final    Gran # (ANC) 02/09/2020 3.9  1.8 - 7.7 K/uL Final    Immature Grans (Abs) 02/09/2020 0.02  0.00 - 0.04 K/uL Final    Comment: Mild elevation in immature granulocytes is non specific and   can be seen in a variety of conditions including stress response,   acute inflammation, trauma and pregnancy. Correlation with other   laboratory and clinical findings is essential.      Lymph # 02/09/2020 1.7  1.0 - 4.8 K/uL Final    Mono # 02/09/2020 0.7  0.3 - 1.0 K/uL Final     Eos # 02/09/2020 0.1  0.0 - 0.5 K/uL Final    Baso # 02/09/2020 0.04  0.00 - 0.20 K/uL Final    nRBC 02/09/2020 0  0 /100 WBC Final    Gran% 02/09/2020 60.5  38.0 - 73.0 % Final    Lymph% 02/09/2020 26.4  18.0 - 48.0 % Final    Mono% 02/09/2020 10.6  4.0 - 15.0 % Final    Eosinophil% 02/09/2020 1.6  0.0 - 8.0 % Final    Basophil% 02/09/2020 0.6  0.0 - 1.9 % Final    Differential Method 02/09/2020 Automated   Final    Sodium 02/09/2020 141  136 - 145 mmol/L Final    Potassium 02/09/2020 3.6  3.5 - 5.1 mmol/L Final    Chloride 02/09/2020 106  95 - 110 mmol/L Final    CO2 02/09/2020 22* 23 - 29 mmol/L Final    Glucose 02/09/2020 106  70 - 110 mg/dL Final    BUN, Bld 02/09/2020 9  8 - 23 mg/dL Final    Creatinine 02/09/2020 0.8  0.5 - 1.4 mg/dL Final    Calcium 02/09/2020 9.9  8.7 - 10.5 mg/dL Final    Total Protein 02/09/2020 8.2  6.0 - 8.4 g/dL Final    Albumin 02/09/2020 3.7  3.5 - 5.2 g/dL Final    Total Bilirubin 02/09/2020 0.6  0.1 - 1.0 mg/dL Final    Comment: For infants and newborns, interpretation of results should be based  on gestational age, weight and in agreement with clinical  observations.  Premature Infant recommended reference ranges:  Up to 24 hours.............<8.0 mg/dL  Up to 48 hours............<12.0 mg/dL  3-5 days..................<15.0 mg/dL  6-29 days.................<15.0 mg/dL      Alkaline Phosphatase 02/09/2020 94  55 - 135 U/L Final    AST 02/09/2020 12  10 - 40 U/L Final    ALT 02/09/2020 8* 10 - 44 U/L Final    Anion Gap 02/09/2020 13  8 - 16 mmol/L Final    eGFR if African American 02/09/2020 >60.0  >60 mL/min/1.73 m^2 Final    eGFR if non African American 02/09/2020 >60.0  >60 mL/min/1.73 m^2 Final    Comment: Calculation used to obtain the estimated glomerular filtration  rate (eGFR) is the CKD-EPI equation.       TSH 02/09/2020 0.491  0.400 - 4.000 uIU/mL Final    Alcohol, Medical, Serum 02/09/2020 <10  <10 mg/dL Final    Acetaminophen (Tylenol),  Serum 02/09/2020 <3.0* 10.0 - 20.0 ug/mL Final    Comment: Toxic Levels:  Adults (4 hr post-ingestion).........>150 ug/mL  Adults (12 hr post-ingestion)........>40 ug/mL  Peds (2 hr post-ingestion, liquid)...>225 ug/mL             Christophe Iglesias

## 2020-11-20 ENCOUNTER — LAB VISIT (OUTPATIENT)
Dept: LAB | Facility: OTHER | Age: 75
End: 2020-11-20
Payer: MEDICARE

## 2020-11-20 DIAGNOSIS — Z03.818 ENCOUNTER FOR OBSERVATION FOR SUSPECTED EXPOSURE TO OTHER BIOLOGICAL AGENTS RULED OUT: ICD-10-CM

## 2020-11-20 PROCEDURE — U0003 INFECTIOUS AGENT DETECTION BY NUCLEIC ACID (DNA OR RNA); SEVERE ACUTE RESPIRATORY SYNDROME CORONAVIRUS 2 (SARS-COV-2) (CORONAVIRUS DISEASE [COVID-19]), AMPLIFIED PROBE TECHNIQUE, MAKING USE OF HIGH THROUGHPUT TECHNOLOGIES AS DESCRIBED BY CMS-2020-01-R: HCPCS

## 2020-11-22 LAB — SARS-COV-2 RNA RESP QL NAA+PROBE: NOT DETECTED

## 2022-10-12 ENCOUNTER — HOSPITAL ENCOUNTER (EMERGENCY)
Facility: OTHER | Age: 77
Discharge: PSYCHIATRIC OR SUBSTANCE USE FACILITY | End: 2022-10-12
Attending: EMERGENCY MEDICINE
Payer: MEDICARE

## 2022-10-12 VITALS
HEIGHT: 66 IN | HEART RATE: 61 BPM | DIASTOLIC BLOOD PRESSURE: 69 MMHG | BODY MASS INDEX: 36.48 KG/M2 | WEIGHT: 227 LBS | TEMPERATURE: 98 F | SYSTOLIC BLOOD PRESSURE: 143 MMHG | RESPIRATION RATE: 18 BRPM | OXYGEN SATURATION: 96 %

## 2022-10-12 DIAGNOSIS — Z00.8 MEDICAL CLEARANCE FOR PSYCHIATRIC ADMISSION: Primary | ICD-10-CM

## 2022-10-12 DIAGNOSIS — F30.10 MANIC BEHAVIOR: ICD-10-CM

## 2022-10-12 LAB
ALBUMIN SERPL BCP-MCNC: 3.8 G/DL (ref 3.5–5.2)
ALP SERPL-CCNC: 70 U/L (ref 55–135)
ALT SERPL W/O P-5'-P-CCNC: 28 U/L (ref 10–44)
AMPHET+METHAMPHET UR QL: NEGATIVE
ANION GAP SERPL CALC-SCNC: 11 MMOL/L (ref 8–16)
APAP SERPL-MCNC: <3 UG/ML (ref 10–20)
AST SERPL-CCNC: 38 U/L (ref 10–40)
BARBITURATES UR QL SCN>200 NG/ML: NEGATIVE
BASOPHILS # BLD AUTO: 0.03 K/UL (ref 0–0.2)
BASOPHILS NFR BLD: 0.4 % (ref 0–1.9)
BENZODIAZ UR QL SCN>200 NG/ML: NEGATIVE
BILIRUB SERPL-MCNC: 0.5 MG/DL (ref 0.1–1)
BILIRUB UR QL STRIP: NEGATIVE
BUN SERPL-MCNC: 23 MG/DL (ref 8–23)
BZE UR QL SCN: NEGATIVE
CALCIUM SERPL-MCNC: 9.1 MG/DL (ref 8.7–10.5)
CANNABINOIDS UR QL SCN: NEGATIVE
CHLORIDE SERPL-SCNC: 114 MMOL/L (ref 95–110)
CLARITY UR: CLEAR
CO2 SERPL-SCNC: 17 MMOL/L (ref 23–29)
COLOR UR: YELLOW
CREAT SERPL-MCNC: 1.3 MG/DL (ref 0.5–1.4)
CREAT UR-MCNC: 172.8 MG/DL (ref 15–325)
CTP QC/QA: YES
DIFFERENTIAL METHOD: ABNORMAL
EOSINOPHIL # BLD AUTO: 0.1 K/UL (ref 0–0.5)
EOSINOPHIL NFR BLD: 1.9 % (ref 0–8)
ERYTHROCYTE [DISTWIDTH] IN BLOOD BY AUTOMATED COUNT: 15.5 % (ref 11.5–14.5)
EST. GFR  (NO RACE VARIABLE): 43 ML/MIN/1.73 M^2
ETHANOL SERPL-MCNC: <10 MG/DL
GLUCOSE SERPL-MCNC: 104 MG/DL (ref 70–110)
GLUCOSE UR QL STRIP: NEGATIVE
HCT VFR BLD AUTO: 39.8 % (ref 37–48.5)
HCV AB SERPL QL IA: NEGATIVE
HGB BLD-MCNC: 13.1 G/DL (ref 12–16)
HGB UR QL STRIP: NEGATIVE
HIV 1+2 AB+HIV1 P24 AG SERPL QL IA: NEGATIVE
IMM GRANULOCYTES # BLD AUTO: 0.02 K/UL (ref 0–0.04)
IMM GRANULOCYTES NFR BLD AUTO: 0.3 % (ref 0–0.5)
KETONES UR QL STRIP: ABNORMAL
LEUKOCYTE ESTERASE UR QL STRIP: NEGATIVE
LYMPHOCYTES # BLD AUTO: 1.7 K/UL (ref 1–4.8)
LYMPHOCYTES NFR BLD: 24.4 % (ref 18–48)
MCH RBC QN AUTO: 28.2 PG (ref 27–31)
MCHC RBC AUTO-ENTMCNC: 32.9 G/DL (ref 32–36)
MCV RBC AUTO: 86 FL (ref 82–98)
METHADONE UR QL SCN>300 NG/ML: NEGATIVE
MONOCYTES # BLD AUTO: 0.7 K/UL (ref 0.3–1)
MONOCYTES NFR BLD: 10.7 % (ref 4–15)
NEUTROPHILS # BLD AUTO: 4.2 K/UL (ref 1.8–7.7)
NEUTROPHILS NFR BLD: 62.3 % (ref 38–73)
NITRITE UR QL STRIP: NEGATIVE
NRBC BLD-RTO: 0 /100 WBC
OPIATES UR QL SCN: NEGATIVE
PCP UR QL SCN>25 NG/ML: NEGATIVE
PH UR STRIP: 5 [PH] (ref 5–8)
PLATELET # BLD AUTO: 303 K/UL (ref 150–450)
PMV BLD AUTO: 10.4 FL (ref 9.2–12.9)
POTASSIUM SERPL-SCNC: 3.7 MMOL/L (ref 3.5–5.1)
PROT SERPL-MCNC: 7.6 G/DL (ref 6–8.4)
PROT UR QL STRIP: NEGATIVE
RBC # BLD AUTO: 4.65 M/UL (ref 4–5.4)
SARS-COV-2 RDRP RESP QL NAA+PROBE: NEGATIVE
SODIUM SERPL-SCNC: 142 MMOL/L (ref 136–145)
SP GR UR STRIP: >=1.03 (ref 1–1.03)
TOXICOLOGY INFORMATION: NORMAL
TSH SERPL DL<=0.005 MIU/L-ACNC: 1 UIU/ML (ref 0.4–4)
URN SPEC COLLECT METH UR: ABNORMAL
UROBILINOGEN UR STRIP-ACNC: NEGATIVE EU/DL
WBC # BLD AUTO: 6.76 K/UL (ref 3.9–12.7)

## 2022-10-12 PROCEDURE — 80143 DRUG ASSAY ACETAMINOPHEN: CPT | Performed by: EMERGENCY MEDICINE

## 2022-10-12 PROCEDURE — 87635 SARS-COV-2 COVID-19 AMP PRB: CPT | Performed by: EMERGENCY MEDICINE

## 2022-10-12 PROCEDURE — 84443 ASSAY THYROID STIM HORMONE: CPT | Performed by: EMERGENCY MEDICINE

## 2022-10-12 PROCEDURE — 80053 COMPREHEN METABOLIC PANEL: CPT | Performed by: EMERGENCY MEDICINE

## 2022-10-12 PROCEDURE — 80307 DRUG TEST PRSMV CHEM ANLYZR: CPT | Performed by: EMERGENCY MEDICINE

## 2022-10-12 PROCEDURE — 85025 COMPLETE CBC W/AUTO DIFF WBC: CPT | Performed by: EMERGENCY MEDICINE

## 2022-10-12 PROCEDURE — 82077 ASSAY SPEC XCP UR&BREATH IA: CPT | Performed by: EMERGENCY MEDICINE

## 2022-10-12 PROCEDURE — 87389 HIV-1 AG W/HIV-1&-2 AB AG IA: CPT | Performed by: EMERGENCY MEDICINE

## 2022-10-12 PROCEDURE — 81003 URINALYSIS AUTO W/O SCOPE: CPT | Mod: 59 | Performed by: EMERGENCY MEDICINE

## 2022-10-12 PROCEDURE — 99285 EMERGENCY DEPT VISIT HI MDM: CPT | Mod: 25

## 2022-10-12 PROCEDURE — 86803 HEPATITIS C AB TEST: CPT | Performed by: EMERGENCY MEDICINE

## 2022-10-12 NOTE — ED TRIAGE NOTES
Pt presents to the ED by [facility] w/ OPC by daughter. Per OPC, pt is noncompliant with meds, is a danger to self and others, aggravates others, and walks into traffic. Pt denies SI/HI. Pt acting bizarre in waiting room; acting appropriate in ED 09. Pt also presenting with bilateral foot edema. Per OPC, hx of bipolar and schizophrenia.

## 2022-10-12 NOTE — ED NOTES
Pt belongings and valuables given to security.    Pt valuables: [id number]  Pair of earrings  1 ring  1 cell phone screen intact  Losartan 100mg tablets  Vitamin D3  Amlodipine 10mg tablets  Simvastatin 10mg tablets  1 red wallet  1 LA ID  1 mastercard  1 set of keys  1 phone     Pt belongings:  1 pair of sandals  1 bra  1 pair of jeans  1 shirt  1 black purse with miscellaneous items    1 green suitcase with the followin t shirt  2 travel bottles of body wash  Miscellaneous toiletries  2 small towels  Miscellaneous clothes  1

## 2022-10-12 NOTE — ED PROVIDER NOTES
Encounter Date: 10/12/2022       History     Chief Complaint   Patient presents with    Psychiatric Evaluation     Pt brought in by [facility] with opc filed by pt child.  Opc states pt is non compliant with meds. Pt aggravates random people, walks out in traffic and verbally abusive.  Pt denies SI or HI however opc states she is danger to self and others.  AAO x 3 juliusn     Ernestine Montejo is a 77-year-old female past medical history of depression, hypertension, bipolar disorder presenting today with an OPC followed by her daughter.  Per OPC, patient has been walking into traffic, verbally abusive and is a danger to herself.  Per patient, the daughter just wants her social security checks.  I have called her daughter, Natali Andres, he states that patient lives in an independent living facility.  She has been called several times by the  over the past couple weeks for her mother's erratic behavior.  She has been walking around in the streets with only socks, trying to wave down traffic and bothering other tenants.   She has had previous psychiatric admissions.  Daughter thinks that she has been off of her medication.  Patient apparently was previously treated with lithium however has been off the medication for several years.  Was started in 2020 on Remeron to take nightly.  Per daughter, she has never picked up the medication from the pharmacy for her mother.  Per chart review, patient last seen by Psychiatry 07/31/2020    Review of patient's allergies indicates:   Allergen Reactions    Codeine Edema     Past Medical History:   Diagnosis Date    Depression     High blood cholesterol     Hypertension     Mood disorder      Past Surgical History:   Procedure Laterality Date    HYSTERECTOMY       No family history on file.  Social History     Tobacco Use    Smoking status: Never   Substance Use Topics    Alcohol use: No    Drug use: No     Review of Systems   Constitutional:  Negative for fever.   HENT:   Negative for sore throat.    Respiratory:  Negative for shortness of breath.    Cardiovascular:  Negative for chest pain.   Gastrointestinal:  Negative for nausea.   Genitourinary:  Negative for dysuria.   Musculoskeletal:  Negative for back pain.   Skin:  Negative for rash.   Neurological:  Negative for weakness.   Hematological:  Does not bruise/bleed easily.   Psychiatric/Behavioral:  Positive for agitation and behavioral problems. Negative for hallucinations, sleep disturbance and suicidal ideas.      Physical Exam     Initial Vitals [10/12/22 1731]   BP Pulse Resp Temp SpO2   125/62 73 18 98.1 °F (36.7 °C) 98 %      MAP       --         Physical Exam    Nursing note and vitals reviewed.  Constitutional: She appears well-developed and well-nourished. No distress.   HENT:   Mouth/Throat: Oropharynx is clear and moist.   Eyes: Conjunctivae are normal. No scleral icterus.   Neck: No JVD present.   Cardiovascular:  Normal rate, regular rhythm and intact distal pulses.           Pulmonary/Chest: Breath sounds normal. No respiratory distress. She has no wheezes. She has no rales.   Abdominal: Abdomen is soft. Bowel sounds are normal. She exhibits no distension. There is no abdominal tenderness. There is no rebound.   Musculoskeletal:         General: No edema.     Lymphadenopathy:     She has no cervical adenopathy.   Neurological: She is alert and oriented to person, place, and time.   Skin: Skin is warm. No rash noted.   Psychiatric: She has a normal mood and affect. Her behavior is normal. Her speech is rapid and/or pressured. Thought content is not paranoid. She expresses no homicidal and no suicidal ideation. She is attentive.       ED Course   Procedures  Labs Reviewed   CBC W/ AUTO DIFFERENTIAL - Abnormal; Notable for the following components:       Result Value    RDW 15.5 (*)     All other components within normal limits   COMPREHENSIVE METABOLIC PANEL - Abnormal; Notable for the following components:     Chloride 114 (*)     CO2 17 (*)     eGFR 43 (*)     All other components within normal limits   ACETAMINOPHEN LEVEL - Abnormal; Notable for the following components:    Acetaminophen (Tylenol), Serum <3.0 (*)     All other components within normal limits   TSH   ALCOHOL,MEDICAL (ETHANOL)   HIV 1 / 2 ANTIBODY    Narrative:     Release to patient->Immediate   HEPATITIS C ANTIBODY    Narrative:     Release to patient->Immediate   URINALYSIS, REFLEX TO URINE CULTURE   DRUG SCREEN PANEL, URINE EMERGENCY   SARS-COV-2 RDRP GENE          Imaging Results    None          Medications - No data to display  Medical Decision Making:   History:   Old Medical Records: I decided to obtain old medical records.  Initial Assessment:   Emergent evaluation a 77-year-old female history of bipolar disorder, noncompliant with medication presenting today with OPC for psych evaluation.  VSS  Differential Diagnosis:   Acute psychosis, medication noncompliance  Clinical Tests:   Lab Tests: Ordered and Reviewed  ED Management:  - medical clearance labs  - PEC for manic episode,  grave disability.           ED Course as of 10/12/22 1911   Wed Oct 12, 2022   1908 Patient is medically cleared and stable for psychiatric transfer and evaluation   [GM]      ED Course User Index  [GM] Alva Tong MD       Medically cleared for psychiatry placement: 10/12/2022  7:09 PM         Clinical Impression:   Final diagnoses:  [Z00.8] Medical clearance for psychiatric admission (Primary)  [F30.10] Manic behavior      ED Disposition Condition    Transfer to Psych Facility Stable          ED Prescriptions    None       Follow-up Information    None          Alva Tong MD  10/12/22 1911

## 2022-10-13 PROBLEM — R60.9 EDEMA: Status: ACTIVE | Noted: 2022-10-13

## 2022-10-13 PROBLEM — I10 HTN (HYPERTENSION): Status: ACTIVE | Noted: 2022-10-13

## 2022-10-13 PROBLEM — E78.5 HLD (HYPERLIPIDEMIA): Status: ACTIVE | Noted: 2022-10-13

## 2022-10-13 PROBLEM — R46.2 BIZARRE BEHAVIOR: Status: ACTIVE | Noted: 2022-10-13

## 2022-10-26 ENCOUNTER — OFFICE VISIT (OUTPATIENT)
Dept: PSYCHIATRY | Facility: CLINIC | Age: 77
End: 2022-10-26
Payer: MEDICARE

## 2022-10-26 VITALS
SYSTOLIC BLOOD PRESSURE: 162 MMHG | WEIGHT: 232.69 LBS | DIASTOLIC BLOOD PRESSURE: 72 MMHG | HEART RATE: 61 BPM | BODY MASS INDEX: 37.56 KG/M2

## 2022-10-26 DIAGNOSIS — F31.9 BIPOLAR 1 DISORDER: Primary | ICD-10-CM

## 2022-10-26 PROCEDURE — 99999 PR PBB SHADOW E&M-EST. PATIENT-LVL II: CPT | Mod: PBBFAC,,, | Performed by: PSYCHIATRY & NEUROLOGY

## 2022-10-26 PROCEDURE — 99214 PR OFFICE/OUTPT VISIT, EST, LEVL IV, 30-39 MIN: ICD-10-PCS | Mod: S$GLB,,, | Performed by: PSYCHIATRY & NEUROLOGY

## 2022-10-26 PROCEDURE — 99214 OFFICE O/P EST MOD 30 MIN: CPT | Mod: S$GLB,,, | Performed by: PSYCHIATRY & NEUROLOGY

## 2022-10-26 PROCEDURE — 99999 PR PBB SHADOW E&M-EST. PATIENT-LVL II: ICD-10-PCS | Mod: PBBFAC,,, | Performed by: PSYCHIATRY & NEUROLOGY

## 2022-10-26 RX ORDER — RISPERIDONE 1 MG/1
1 TABLET ORAL NIGHTLY
Qty: 30 TABLET | Refills: 3 | Status: SHIPPED | OUTPATIENT
Start: 2022-10-26 | End: 2022-11-25

## 2022-10-26 RX ORDER — RISPERIDONE 0.5 MG/1
0.5 TABLET ORAL EVERY MORNING
Qty: 30 TABLET | Refills: 3 | Status: SHIPPED | OUTPATIENT
Start: 2022-10-26 | End: 2022-11-25

## 2022-10-26 NOTE — PROGRESS NOTES
ESTABLISHED OUTPATIENT VISIT   E/M LEVEL 4: [id number]    ENCOUNTER DATE: 10/26/2022  SITE: [facility], [address]    HISTORY    CHIEF COMPLAINT   Ernestine Mnotejo is a 77 y.o. female who presents for follow up of mood d/o .    HPI     Recent psychiatric hospitalization. Please see discharge summary from 10/20/22.    Today reports doing well psychiatrically. Mood is stable, sleeping about a total of 8 hours. Denies AH's, denies paranoid feelings.    On exam today no EPS noted.    Psychiatric Review Of Systems - Is patient experiencing or having changes in:  sleep: total of about 8 hours, interrupted  appetite: no  Weight: no  interest/pleasure/anhedonia: no  somatic symptoms: no  libido: no  anxiety/panic: no  guilty/hopelessness: no  concentration: no  S.I.B.s/risky behavior: no  Irritability: no  Racing thoughts: no  Impulsive behaviors: no  Paranoia:no  AVH:no    Recent alcohol: no  Recent drug: no    Medical ROS   Denies any current physical complaint     PAST MEDICAL, FAMILY AND SOCIAL HISTORY: The patient's past medical, family and social history have been reviewed and updated as appropriate within the electronic medical record - see encounter notes.    PSYCHOTROPIC MEDICATIONS   Risperdal 0.5 mg qam and 1 mg at bedtime    EXAMINATION    Vitals:    10/26/22 0739   BP: (!) 162/72   Pulse: 61       CONSTITUTIONAL  General Appearance: well nourished    MUSCULOSKELETAL  Muscle Strength and Tone: normal strength and tone  Abnormal Involuntary Movements: no abnormal movement noted  Gait and Station: normal gait    PSYCHIATRIC   Level of Consciousness: alert  Orientation: oriented to person, place and time  Grooming: well groomed  Psychomotor Behavior: no restlessness/agitation  Speech: normal in rate, rhythm and volume  Language: normal vocabulary  Mood: euthymic  Affect: full range and appropriate  Thought Process: logical and goal directed  Associations: intact associations  Thought Content: no  SI/HI  Memory: grossly intact  Attention: intact to content of interview  Fund of Knowledge: appears adequate  Insight: good  Judgement: good    MEDICAL DECISION MAKING    DIAGNOSES  Bipolar d/o    PROBLEM LIST AND MANAGEMENT PLANS  - Bipolar d/o: continue Risperdal 0.5 mg qam and 1 mg at bedtime  - rtc 3 months    Time with patient: 15 min    LABORATORY DATA  Admission on 10/12/2022, Discharged on 10/12/2022   Component Date Value Ref Range Status    WBC 10/12/2022 6.76  3.90 - 12.70 K/uL Final    RBC 10/12/2022 4.65  4.00 - 5.40 M/uL Final    Hemoglobin 10/12/2022 13.1  12.0 - 16.0 g/dL Final    Hematocrit 10/12/2022 39.8  37.0 - 48.5 % Final    MCV 10/12/2022 86  82 - 98 fL Final    MCH 10/12/2022 28.2  27.0 - 31.0 pg Final    MCHC 10/12/2022 32.9  32.0 - 36.0 g/dL Final    RDW 10/12/2022 15.5 (H)  11.5 - 14.5 % Final    Platelets 10/12/2022 303  150 - 450 K/uL Final    MPV 10/12/2022 10.4  9.2 - 12.9 fL Final    Immature Granulocytes 10/12/2022 0.3  0.0 - 0.5 % Final    Gran # (ANC) 10/12/2022 4.2  1.8 - 7.7 K/uL Final    Immature Grans (Abs) 10/12/2022 0.02  0.00 - 0.04 K/uL Final    Comment: Mild elevation in immature granulocytes is non specific and   can be seen in a variety of conditions including stress response,   acute inflammation, trauma and pregnancy. Correlation with other   laboratory and clinical findings is essential.      Lymph # 10/12/2022 1.7  1.0 - 4.8 K/uL Final    Mono # 10/12/2022 0.7  0.3 - 1.0 K/uL Final    Eos # 10/12/2022 0.1  0.0 - 0.5 K/uL Final    Baso # 10/12/2022 0.03  0.00 - 0.20 K/uL Final    nRBC 10/12/2022 0  0 /100 WBC Final    Gran % 10/12/2022 62.3  38.0 - 73.0 % Final    Lymph % 10/12/2022 24.4  18.0 - 48.0 % Final    Mono % 10/12/2022 10.7  4.0 - 15.0 % Final    Eosinophil % 10/12/2022 1.9  0.0 - 8.0 % Final    Basophil % 10/12/2022 0.4  0.0 - 1.9 % Final    Differential Method 10/12/2022 Automated   Final    Sodium 10/12/2022 142  136 - 145 mmol/L Final    Potassium  10/12/2022 3.7  3.5 - 5.1 mmol/L Final    Chloride 10/12/2022 114 (H)  95 - 110 mmol/L Final    CO2 10/12/2022 17 (L)  23 - 29 mmol/L Final    Glucose 10/12/2022 104  70 - 110 mg/dL Final    BUN 10/12/2022 23  8 - 23 mg/dL Final    Creatinine 10/12/2022 1.3  0.5 - 1.4 mg/dL Final    Calcium 10/12/2022 9.1  8.7 - 10.5 mg/dL Final    Total Protein 10/12/2022 7.6  6.0 - 8.4 g/dL Final    Albumin 10/12/2022 3.8  3.5 - 5.2 g/dL Final    Total Bilirubin 10/12/2022 0.5  0.1 - 1.0 mg/dL Final    Comment: For infants and newborns, interpretation of results should be based  on gestational age, weight and in agreement with clinical  observations.    Premature Infant recommended reference ranges:  Up to 24 hours.............<8.0 mg/dL  Up to 48 hours............<12.0 mg/dL  3-5 days..................<15.0 mg/dL  6-29 days.................<15.0 mg/dL      Alkaline Phosphatase 10/12/2022 70  55 - 135 U/L Final    AST 10/12/2022 38  10 - 40 U/L Final    ALT 10/12/2022 28  10 - 44 U/L Final    Anion Gap 10/12/2022 11  8 - 16 mmol/L Final    eGFR 10/12/2022 43 (A)  >60 mL/min/1.73 m^2 Final    TSH 10/12/2022 1.004  0.400 - 4.000 uIU/mL Final    Specimen UA 10/12/2022 Urine, Clean Catch   Final    Color, UA 10/12/2022 Yellow  Yellow, Straw, Abbi Final    Appearance, UA 10/12/2022 Clear  Clear Final    pH, UA 10/12/2022 5.0  5.0 - 8.0 Final    Specific Gravity, UA 10/12/2022 >=1.030 (A)  1.005 - 1.030 Final    Protein, UA 10/12/2022 Negative  Negative Final    Comment: Recommend a 24 hour urine protein or a urine   protein/creatinine ratio if globulin induced proteinuria is  clinically suspected.      Glucose, UA 10/12/2022 Negative  Negative Final    Ketones, UA 10/12/2022 Trace (A)  Negative Final    Bilirubin (UA) 10/12/2022 Negative  Negative Final    Occult Blood UA 10/12/2022 Negative  Negative Final    Nitrite, UA 10/12/2022 Negative  Negative Final    Urobilinogen, UA 10/12/2022 Negative  <2.0 EU/dL Final    Leukocytes, UA  10/12/2022 Negative  Negative Final    Benzodiazepines 10/12/2022 Negative  Negatiive Final    Methadone metabolites 10/12/2022 Negative  Negative Final    Cocaine (Metab.) 10/12/2022 Negative  Negative Final    Opiate Scrn, Ur 10/12/2022 Negative  Negative Final    Barbiturate Screen, Ur 10/12/2022 Negative  Negative Final    Amphetamine Screen, Ur 10/12/2022 Negative  Negative Final    THC 10/12/2022 Negative  Negative Final    Phencyclidine 10/12/2022 Negative  Negative Final    Creatinine, Urine 10/12/2022 172.8  15.0 - 325.0 mg/dL Final    Toxicology Information 10/12/2022 SEE COMMENT   Final    Comment: This screen includes the following classes of drugs at the listed   cut-off:    Benzodiazepines 200 ng/ml  Methadone 300 ng/ml  Cocaine metabolite 300 ng/ml  Opiates 300 ng/ml  Barbiturates 200 ng/ml  Amphetamines 1000 ng/ml  Marijuana metabs (THC) 50 ng/ml  Phencyclidine (PCP) 25 ng/ml    This is a screening test. If results do not correlate with clinical   presentation, then a confirmatory send out test is advised.     This report is intended for use in clinical monitoring and management   of   patients. It is not intended for use in employment related drug   testing.      Alcohol, Serum 10/12/2022 <10  <10 mg/dL Final    Acetaminophen (Tylenol), Serum 10/12/2022 <3.0 (L)  10.0 - 20.0 ug/mL Final    Comment: Toxic Levels:  Adults (4 hr post-ingestion).........>150 ug/mL  Adults (12 hr post-ingestion)........>40 ug/mL  Peds (2 hr post-ingestion, liquid)...>225 ug/mL      POC Rapid COVID 10/12/2022 Negative  Negative Final     Acceptable 10/12/2022 Yes   Final    HIV 1/2 Ag/Ab 10/12/2022 Negative  Negative Final    Hepatitis C Ab 10/12/2022 Negative  Negative Final           Christophe Iglesias

## 2022-10-28 ENCOUNTER — TELEPHONE (OUTPATIENT)
Dept: PSYCHIATRY | Facility: CLINIC | Age: 77
End: 2022-10-28
Payer: MEDICARE

## 2022-10-28 ENCOUNTER — TELEPHONE (OUTPATIENT)
Dept: PSYCHIATRY | Facility: HOSPITAL | Age: 77
End: 2022-10-28
Payer: MEDICARE

## 2022-10-28 NOTE — TELEPHONE ENCOUNTER
I again spoke with daughter Adrianna, [phone]. Adrianna feels, that it is acceptable to manage the patient on an outpatient basis at this time. Pt. Has agree to let Adrianna administer Risperidone to her daily.

## 2022-10-28 NOTE — TELEPHONE ENCOUNTER
Received message from daughter Adrianna, then spoke with her on the telephone. Daughter has obtained OPC for the patient due to reported disorganized behavior. I advised the daughter to have the patient evaluated in an emergency room.